# Patient Record
Sex: MALE | Race: WHITE | URBAN - METROPOLITAN AREA
[De-identification: names, ages, dates, MRNs, and addresses within clinical notes are randomized per-mention and may not be internally consistent; named-entity substitution may affect disease eponyms.]

---

## 2021-12-07 ENCOUNTER — OFFICE VISIT (OUTPATIENT)
Dept: SPORTS MEDICINE | Age: 61
End: 2021-12-07
Payer: MEDICAID

## 2021-12-07 VITALS — TEMPERATURE: 98.6 F | HEART RATE: 88 BPM | RESPIRATION RATE: 18 BRPM

## 2021-12-07 DIAGNOSIS — G89.29 CHRONIC RIGHT SACROILIAC JOINT PAIN: ICD-10-CM

## 2021-12-07 DIAGNOSIS — E11.42 DIABETIC POLYNEUROPATHY ASSOCIATED WITH TYPE 2 DIABETES MELLITUS (HCC): ICD-10-CM

## 2021-12-07 DIAGNOSIS — E66.01 MORBID OBESITY (HCC): ICD-10-CM

## 2021-12-07 DIAGNOSIS — M70.61 GREATER TROCHANTERIC BURSITIS OF RIGHT HIP: Primary | ICD-10-CM

## 2021-12-07 DIAGNOSIS — E11.40 TYPE 2 DIABETES MELLITUS WITH DIABETIC NEUROPATHY, WITHOUT LONG-TERM CURRENT USE OF INSULIN (HCC): ICD-10-CM

## 2021-12-07 DIAGNOSIS — M53.3 CHRONIC RIGHT SACROILIAC JOINT PAIN: ICD-10-CM

## 2021-12-07 PROBLEM — E11.9 DIABETES MELLITUS (HCC): Status: ACTIVE | Noted: 2019-01-01

## 2021-12-07 PROCEDURE — 99204 OFFICE O/P NEW MOD 45 MIN: CPT | Performed by: FAMILY MEDICINE

## 2021-12-07 RX ORDER — GABAPENTIN 800 MG/1
TABLET ORAL 3 TIMES DAILY
COMMUNITY

## 2021-12-07 RX ORDER — METHOCARBAMOL 500 MG/1
TABLET, FILM COATED ORAL 4 TIMES DAILY
COMMUNITY

## 2021-12-07 RX ORDER — METFORMIN HYDROCHLORIDE 1000 MG/1
1000 TABLET ORAL 2 TIMES DAILY WITH MEALS
COMMUNITY

## 2021-12-07 RX ORDER — AMITRIPTYLINE HYDROCHLORIDE 10 MG/1
20 TABLET, FILM COATED ORAL
COMMUNITY

## 2021-12-07 RX ORDER — OMEPRAZOLE 40 MG/1
40 CAPSULE, DELAYED RELEASE ORAL DAILY
COMMUNITY

## 2021-12-07 RX ORDER — GLIMEPIRIDE 4 MG/1
TABLET ORAL
COMMUNITY

## 2021-12-07 RX ORDER — LISINOPRIL 10 MG/1
TABLET ORAL DAILY
COMMUNITY

## 2021-12-07 RX ORDER — MELOXICAM 7.5 MG/1
TABLET ORAL DAILY
COMMUNITY

## 2021-12-07 RX ORDER — GUAIFENESIN 100 MG/5ML
81 LIQUID (ML) ORAL DAILY
COMMUNITY

## 2021-12-07 RX ORDER — ATORVASTATIN CALCIUM 10 MG/1
TABLET, FILM COATED ORAL DAILY
COMMUNITY

## 2021-12-07 NOTE — LETTER
12/7/2021    Patient: Rody Oro   YOB: 1960   Date of Visit: 12/7/2021     Perry Ochoa MD  [address] [place] [id number]  Via Fax: [phone]    Dear Perry Ochoa MD,      Thank you for referring Mr. Mahnaz Fields to [place] [address] for evaluation. My notes for this consultation are attached. If you have questions, please do not hesitate to call me. I look forward to following your patient along with you.       Sincerely,    Tamanna Anne MD

## 2021-12-07 NOTE — PROGRESS NOTES
[facility]  Sports Medicine Consultation Note    PCP: Ryne Cuellar MD  Requesting provider: Ryne Cuellar MD       Radames Taylor is a 59 y.o. male (: 1960) presenting to obtain consultative services regarding:  Chief Complaint   Patient presents with    Hip Pain     right    Back Pain     mid        Assessment/Plan:       ICD-10-CM ICD-9-CM   1. Greater trochanteric bursitis of right hip  M70.61 726.5   2. Chronic right sacroiliac joint pain  M53.3 724.6    G89.29 338.29   3. Type 2 diabetes mellitus with diabetic neuropathy, without long-term current use of insulin (HCC)  E11.40 250.60     357.2   4. Morbid obesity ([address]  E66.01 278.01   5. Diabetic polyneuropathy associated with type 2 diabetes mellitus (HCC)  E11.42 250.60     357.2       Discussion:  62yo morbidly obese RIGHT handed male with a PMHx of severe sensory loss neuropathy of bilateral lower extremities (below the knees) and hx of thoracolumbar back pain presents for evaluation for chronic RIGHT hip pain x 1+ years, noting that his PCP (new to him) would like him to wean down his daily use of meloxicam 15mg that he has been taking to manage this pain. Pertinent exam findings: + tender RIGHT great trochanter, + RIGHT SI joint tenderness, relatively weak hip adductors, tight bilateral hamstrings, chronic loss of light touch sensation distal to bilateral knees.       Impression:  RIGHT great troch bursitis  RIGHT SI joint pain / dysfunction  Bilateral lower leg sensory neuropathy      Plan:  > discussed mgmt options at length  > concur with Ryne Cuellar MD that it would be ideal to minimize the use of NSAIDs overall, given his other significant diabetic complications  > encouraged ice as needed  > start home exercise plan  > start formal physical therapy  > discussed consideration for steroid injection, however at this time, he is amenable deferring at this time  > is agreeable to work on weaning down the use of NSAID overall, hopefully to just 7.5mg as needed with goal of <2-3x/wk. Follow-up and Dispositions    · Return if symptoms worsen or fail to improve. Orders Placed This Encounter    REFERRAL TO PHYSICAL THERAPY     Referral Priority:   Routine     Referral Type:   PT/OT/ST     Referral Reason:   Specialty Services Required     Number of Visits Requested:   1         Management plan & patient instructions discussed with Winsome , who voiced understanding. Thank you for the opportunity to participate in the care of this patient. If any questions or concerns at all, please feel free to contact me. This document may have been created with the aid of dictation software. Text may contain errors, particularly phonetic errors. Gabriela Lynch MD  Internal Medicine, Family Medicine & Sports Medicine  12/7/2021    On this date 12/7/2021, I have spent 45 minutes providing care to this patient, which included reviewing the EMR to see if there were any recent visits to the ED, specialists, prior lab or radiology results, obtaining the history from the patient, examining the patient, providing discharge education regarding the diagnosis and counseling on appropriate follow-up, as well as documenting this visit in the EMR.     Subjective   History:     I was asked to provide consultative services by Amado Garnica MD for advice/opinion related to evaluating    Chief Complaint   Patient presents with    Hip Pain     right    Back Pain     mid                T2DM with most recent A1c reportedly 6.7      # RIGHT lateral hip pain x 1.5yr  No inciting trauma  Sx managed well with meloxicam 15mg daily  Worse with lying on RIGHT side, going up the steps leading with LEFT leg  No swelling, no redness  No changes in bowel / bladder habits  Occasionally it is challenging to get comfortable at night to sleep  Causes him to limit his walking \"max is once around [place]\"  Never had any issues like this before  Does not radiate    Has chronic bilateral lower extremity numbness below the knees (EMG proven)  New (to him) PCP would like him to cut back on the meloxicam  Decreased to 7.5mg daily and isn't controlling the pain as well    Also has chronic mid back (thoracolumbar) pain, for which he has attended formal PT in the past, and obtained good relief  Uses methocarbamol as needed, which is clinically effective      Prior Treatments for this complaint:    none    Previous Medications for this complaint:    APAP   Naproxen   IBU    Current Medications for this complaint:   meloxicam    Previous work-up for this complaint has included:    Radiograph(s)     Pain Assessment  12/7/2021   Location of Pain Back   Location Modifiers Medial   Severity of Pain 4   Quality of Pain Other (Comment)   Quality of Pain Comment tightness   Duration of Pain Persistent   Frequency of Pain Constant   Date Pain First Started (No Data)   Date Pain First Started Comment >3 years ago   Aggravating Factors Bending;Stretching;Straightening;Exercise;Kneeling;Squatting;Standing;Stairs; Walking   Limiting Behavior Yes   Relieving Factors NSAID;Rest   Relieving Factors Comment takes the edge off; massage chair   Result of Injury No   Type of Injury Other (Comment)           Past Medical History:   Diagnosis Date    Diabetes ([address]     Hypercholesterolemia     Hypertension      Past Surgical History:   Procedure Laterality Date    HX TONSILLECTOMY        reports that he quit smoking about 13 years ago. He does not have any smokeless tobacco history on file. He reports current alcohol use.   Family History   Problem Relation Age of Onset    Diabetes Sister      No Known Allergies    Problem List:      Patient Active Problem List    Diagnosis    Diabetic neuropathy (HCC)     Formatting of this note might be different from the original.  bilateral lower legs      Diabetes mellitus ([address]    Hyperlipidemia LDL goal < 100  Morbid obesity (HCC)       Medications:     Current Outpatient Medications on File Prior to Visit   Medication Sig Dispense Refill    gabapentin (NEURONTIN) 800 mg tablet Take  by mouth three (3) times daily.  amitriptyline (ELAVIL) 10 mg tablet Take 20 mg by mouth nightly.  atorvastatin (LIPITOR) 10 mg tablet Take  by mouth daily.  meloxicam (MOBIC) 7.5 mg tablet Take  by mouth daily.  methocarbamoL (ROBAXIN) 500 mg tablet Take  by mouth four (4) times daily.  metFORMIN (GLUCOPHAGE) 1,000 mg tablet Take 1,000 mg by mouth two (2) times daily (with meals).  glimepiride (AMARYL) 4 mg tablet Take  by mouth every morning.  dapagliflozin (FARXIGA) 5 mg tab tablet Take  by mouth daily.  aspirin 81 mg chewable tablet Take 81 mg by mouth daily.  omeprazole (PRILOSEC) 40 mg capsule Take 40 mg by mouth daily.  lisinopriL (PRINIVIL, ZESTRIL) 10 mg tablet Take  by mouth daily. No current facility-administered medications on file prior to visit. Objective   Physical Assessment:   VS:    Vitals:    12/07/21 1059   Pulse: 88   Resp: 18   Temp: 98.6 °F (37 °C)   PainSc:   4       Physical Exam  Nursing note reviewed. Constitutional:       General: He is not in acute distress. Appearance: Normal appearance. He is well-developed. He is morbidly obese. HENT:      Head: Normocephalic and atraumatic. Mouth/Throat:      Comments: Mask in place  Musculoskeletal:      Cervical back: Neck supple. Thoracic back: Tenderness (bilateral lower thoracic paraspinal m, lat dorsi m) present. Decreased range of motion. Lumbar back: Tenderness (R > L paraspinal m) present. No bony tenderness. Decreased range of motion. Negative right straight leg raise test and negative left straight leg raise test.      Right hip: Bony tenderness (++ great troch; + SIj) present. No tenderness. Normal range of motion. Decreased strength.       Left hip: No tenderness or bony tenderness. Normal range of motion. Decreased strength. Comments: Discomfort of bilateral proximal adductors with passive hip abduction; Neurological:      Mental Status: He is alert and oriented to person, place, and time. Sensory: Sensory deficit (no sensation to light touch distal to both knees) present. Gait: Gait abnormal (wide based). Deep Tendon Reflexes: Reflexes are normal and symmetric. Psychiatric:         Behavior: Behavior normal. Behavior is cooperative. Thought Content: Thought content normal.         Recent Labs & Imaging:     RIGHT hip XR (11/5/2020): Mild right greater trochanter enthesopathy      MRI Thoracic-Spine (10/07/2019):      1. Multilevel mild thoracic disc disease with no evidence of neural impingement. 2. Multilevel lower cervical spine disc disease, incompletely evaluated on this examination but not highly likely to be producing any significant neural impingement. 3. Intrinsically normal cord. 4. No significant osseous findings.          Review of Previous Medical Records:     Records from PCP / referral:      PCP: Jb JACINTO hip enthesopathy    started meloxicam 7.5mg on 11/5/2021  hx of PUD

## 2021-12-15 ENCOUNTER — APPOINTMENT (OUTPATIENT)
Dept: PHYSICAL THERAPY | Age: 61
End: 2021-12-15

## 2022-01-05 ENCOUNTER — APPOINTMENT (OUTPATIENT)
Dept: PHYSICAL THERAPY | Age: 62
End: 2022-01-05

## 2025-07-23 NOTE — PATIENT INSTRUCTIONS
- ice to the area when painful  - I think it is a good idea to work your way off the daily use of meloxicam... it will get easier to do so with physical therapy!  - you can start with some of the exercises (GENTLY) below, while you are waiting on your initial physical therapy evaluation      Be sure to give the physical therapists some feedback! Let them know: what your short term & long term goals are. .. If exercises seems too easy or too hard. .. If the previous PT session left you more sore than usual, etc.  It is important for them to know these things in order to tailor your rehab plan. The [facility] office should be calling you to schedule in the next 3-5 business days. If you don't hear from them after 1 week, call their office directly to check on the status of your referral.              VISIT SURVEY       You may receive a survey regarding your visit today either by mail or email. Please take the opportunity to let us know how we did. This information helps us continue to improve and provide a great patient experience. Hip Bursitis: Care Instructions  Your Care Instructions     Bursitis is inflammation of the bursa. A bursa is a small sac of fluid that cushions a joint and helps it move easily. A bursa sits between a bone in the hip and the muscles and tendons in the thigh and buttock. Injury or overuse of the hip can cause bursitis. Activities that can lead to bursitis include twisting and rapid joint movement. Bursitis can cause hip pain. Bursitis usually gets better if you avoid the activity that caused it. If pain lasts or gets worse despite home treatment, your doctor may draw fluid from the bursa through a needle. This may relieve your pain and help your doctor know if you have an infection. If so, your doctor will prescribe antibiotics. If you have inflammation only, you may get a corticosteroid shot to reduce swelling and pain.  Sometimes surgery is needed to drain or remove the bursa. Follow-up care is a key part of your treatment and safety. Be sure to make and go to all appointments, and call your doctor if you are having problems. It's also a good idea to know your test results and keep a list of the medicines you take. How can you care for yourself at home? · Put ice or a cold pack on your hip for 10 to 20 minutes at a time. Put a thin cloth between the ice and your skin. · After 3 days of using ice, you may use heat on your hip. You can use a hot water bottle, a heating pad set on low, or a warm, moist towel. · Rest your hip. Stop any activities that cause pain. Switch to activities that do not stress your hip. · Take your medicines exactly as prescribed. Call your doctor if you think you are having a problem with your medicine. · Ask your doctor if you can take an over-the-counter pain medicine, such as acetaminophen (Tylenol), ibuprofen (Advil, Motrin), or naproxen (Aleve). Be safe with medicines. Read and follow all instructions on the label. · To prevent stiffness, gently move the hip joint as much as you can without pain every day. As the pain gets better, keep doing range-of-motion exercises. Ask your doctor for exercises that will make the muscles around the hip joint stronger. Do these as directed. · You can slowly return to the activity that caused the pain, but do it with less effort until you can do it without pain or swelling. Be sure to warm up before and stretch after you do the activity. When should you call for help? Call your doctor now or seek immediate medical care if:    · You have a fever.     · You have increased swelling or redness in your hip.     · You cannot use your hip, or the pain in your hip gets worse. Watch closely for changes in your health, and be sure to contact your doctor if:    · You have pain for 2 weeks or longer despite home treatment. Where can you learn more?   Go to http://www.gray.com/  Enter [id number] in the search box to learn more about \"Hip Bursitis: Care Instructions. \"  Current as of: July 1, 2021               Content Version: 13.0  © 2006-2021 [employer]. Care instructions adapted under license by [employer] (which disclaims liability or warranty for this information). If you have questions about a medical condition or this instruction, always ask your healthcare professional. [address] any warranty or liability for your use of this information. Hip Bursitis: Exercises  Introduction  Here are some examples of exercises for you to try. The exercises may be suggested for a condition or for rehabilitation. Start each exercise slowly. Ease off the exercises if you start to have pain. You will be told when to start these exercises and which ones will work best for you. How to do the exercises  Hip rotator stretch    1. Lie on your back with both knees bent and your feet flat on the floor. 2. Put the ankle of your affected leg on your opposite thigh near your knee. 3. Use your hand to gently push your knee away from your body until you feel a gentle stretch around your hip. 4. Hold the stretch for 15 to 30 seconds. 5. Repeat 2 to 4 times. 6. Repeat steps 1 through 5, but this time use your hand to gently pull your knee toward your opposite shoulder. Iliotibial band stretch    1. Lean sideways against a wall. If you are not steady on your feet, hold on to a chair or counter. 2. Stand on the leg with the affected hip, with that leg close to the wall. Then cross your other leg in front of it. 3. Let your affected hip drop out to the side of your body and against wall. Then lean away from your affected hip until you feel a stretch. 4. Hold the stretch for 15 to 30 seconds. 5. Repeat 2 to 4 times. Straight-leg raises to the outside    1.  Lie on your side, with your affected hip on top.  2. Tighten the front thigh muscles of your top leg to keep your knee straight. 3. Keep your hip and your leg straight in line with the rest of your body, and keep your knee pointing forward. Do not drop your hip back. 4. Lift your top leg straight up toward the ceiling, about 12 inches off the floor. Hold for about 6 seconds, then slowly lower your leg. 5. Repeat 8 to 12 times. Clamshell    1. Lie on your side, with your affected hip on top and your head propped on a pillow. Keep your feet and knees together and your knees bent. 2. Raise your top knee, but keep your feet together. Do not let your hips roll back. Your legs should open up like a clamshell. 3. Hold for 6 seconds. 4. Slowly lower your knee back down. Rest for 10 seconds. 5. Repeat 8 to 12 times. Follow-up care is a key part of your treatment and safety. Be sure to make and go to all appointments, and call your doctor if you are having problems. It's also a good idea to know your test results and keep a list of the medicines you take. Where can you learn more? Go to http://www.almonte.com/  Enter H674 in the search box to learn more about \"Hip Bursitis: Exercises. \"  Current as of: July 1, 2021               Content Version: 13.0  © 2006-2021 [employer]. Care instructions adapted under license by [employer] (which disclaims liability or warranty for this information). If you have questions about a medical condition or this instruction, always ask your healthcare professional. [place] [address] any warranty or liability for your use of this information. Sacroiliac Joint Pain: Care Instructions  Your Care Instructions     The sacroiliac joints connect the spine and each side of the pelvis. These joints bear the weight and stress of your torso. This makes them easy to injure. Injury or overuse of these joints may cause low back pain.   Stress on these joints can cause joint pain. Sacroiliac joint pain is more common in pregnant women. Certain kinds of arthritis also may cause this type of joint pain. Home treatment may help you feel better. So can avoiding activities that stress your back. Your doctor also may recommend physical therapy. This may include doing exercises and stretches to help with pain. You may also learn to use good posture. Follow-up care is a key part of your treatment and safety. Be sure to make and go to all appointments, and call your doctor if you are having problems. It's also a good idea to know your test results and keep a list of the medicines you take. How can you care for yourself at home? · Ask your doctor about light exercises that may help your back pain. Try to do light activity throughout the day. But make sure to take rests as needed. Find a comfortable position for rest, but don't stay in one position for too long. Avoid activities that cause pain. · To apply heat, put a warm water bottle, a heating pad set on low, or a warm cloth on your back. Do not go to sleep with a heating pad on your skin. · Put ice or a cold pack on your back for 10 to 20 minutes at a time. Put a thin cloth between the ice and your skin. · If the doctor gave you a prescription medicine for pain, take it as prescribed. · If you are not taking a prescription pain medicine, ask your doctor if you can take an over-the-counter pain medicine, such as acetaminophen (Tylenol), ibuprofen (Advil, Motrin), or naproxen (Aleve). Read and follow all instructions on the label. Take pain medicines exactly as directed. · Do not take two or more pain medicines at the same time unless the doctor told you to. Many pain medicines have acetaminophen, which is Tylenol. Too much acetaminophen (Tylenol) can be harmful. · To prevent future back pain, do exercises to stretch and strengthen your back and stomach.  Learn how to use good posture, safe lifting techniques, and proper body mechanics. When should you call for help? Call 911 anytime you think you may need emergency care. For example, call if:    · You are unable to move a leg at all. Call your doctor now or seek immediate medical care if:    · You have new or worse symptoms in your legs or buttocks. Symptoms may include:  ? Numbness or tingling. ? Weakness. ? Pain.     · You lose bladder or bowel control. Watch closely for changes in your health, and be sure to contact your doctor if:    · You are not getting better as expected. Where can you learn more? Go to http://www.gray.com/  Enter Y571 in the search box to learn more about \"Sacroiliac Joint Pain: Care Instructions. \"  Current as of: July 1, 2021               Content Version: 13.0  © 2006-2021 [employer]. Care instructions adapted under license by [employer] (which disclaims liability or warranty for this information). If you have questions about a medical condition or this instruction, always ask your healthcare professional. [place] [address] any warranty or liability for your use of this information. Sacroiliac Pain: Exercises  Introduction  Here are some examples of exercises for you to try. The exercises may be suggested for a condition or for rehabilitation. Start each exercise slowly. Ease off the exercises if you start to have pain. You will be told when to start these exercises and which ones will work best for you. How to do the exercises  Knee-to-chest stretch    1. Do not do the knee-to-chest exercise if it causes or increases back or leg pain. 2. Lie on your back with your knees bent and your feet flat on the floor. You can put a small pillow under your head and neck if it is more comfortable. 3. Grasp your hands under one knee and bring the knee to your chest, keeping the other foot flat on the floor. 4. Keep your lower back pressed to the floor.  Hold for at least 15 to 30 seconds. 5. Relax and lower the knee to the starting position. Repeat with the other leg. 6. Repeat 2 to 4 times with each leg. 7. To get more stretch, keep your other leg flat on the floor while pulling your knee to your chest.  Bridging    1. Lie on your back with both knees bent. Your knees should be bent about 90 degrees. 2. Tighten your belly muscles by pulling in your belly button toward your spine. Then push your feet into the floor, squeeze your buttocks, and lift your hips off the floor until your shoulders, hips, and knees are all in a straight line. 3. Hold for about 6 seconds as you continue to breathe normally, and then slowly lower your hips back down to the floor and rest for up to 10 seconds. 4. Repeat 8 to 12 times. Hip extension    1. Get down on your hands and knees on the floor. 2. Keeping your back and neck straight, lift one leg straight out behind you. When you lift your leg, keep your hips level. Don't let your back twist, and don't let your hip drop toward the floor. 3. Hold for 6 seconds. Repeat 8 to 12 times with each leg. 4. If you feel steady and strong when you do this exercise, you can make it more difficult. To do this, when you lift your leg, also lift the opposite arm straight out in front of you. For example, lift the left leg and the right arm at the same time. (This is sometimes called the \"bird dog exercise. \") Hold for 6 seconds, and repeat 8 to 12 times on each side. Clamshell    1. Lie on your side with a pillow under your head. Keep your feet and knees together and your knees bent. 2. Raise your top knee, but keep your feet together. Do not let your hips roll back. Your legs should open up like a clamshell. 3. Hold for 6 seconds. 4. Slowly lower your knee back down. Rest for 10 seconds. 5. Repeat 8 to 12 times. 6. Switch to your other side and repeat steps 1 through 5. Hamstring wall stretch    1.  Lie on your back in a doorway, with one leg through the open door.  2. Slide your affected leg up the wall to straighten your knee. You should feel a gentle stretch down the back of your leg. 3. Hold the stretch for at least 1 minute to begin. Then try to lengthen the time you hold the stretch to as long as 6 minutes. 4. Switch legs, and repeat steps 1 through 3.  5. Repeat 2 to 4 times. 6. If you do not have a place to do this exercise in a doorway, there is another way to do it:  7. Lie on your back, and bend one knee. 8. Loop a towel under the ball and toes of that foot, and hold the ends of the towel in your hands. 9. Straighten your knee, and slowly pull back on the towel. You should feel a gentle stretch down the back of your leg. 10. Switch legs, and repeat steps 1 through 3.  11. Repeat 2 to 4 times. 1. Do not arch your back. 2. Do not bend either knee. 3. Keep one heel touching the floor and the other heel touching the wall. Do not point your toes. Lower abdominal strengthening    1. Lie on your back with your knees bent and your feet flat on the floor. 2. Tighten your belly muscles by pulling your belly button in toward your spine. 3. Lift one foot off the floor and bring your knee toward your chest, so that your knee is straight above your hip and your leg is bent like the letter \"L. \"  4. Lift the other knee up to the same position. 5. Lower one leg at a time to the starting position. 6. Keep alternating legs until you have lifted each leg 8 to 12 times. 7. Be sure to keep your belly muscles tight and your back still as you are moving your legs. Be sure to breathe normally. Piriformis stretch    1. Lie on your back with your legs straight. 2. Lift your affected leg, and bend your knee. With your opposite hand, reach across your body, and then gently pull your knee toward your opposite shoulder. 3. Hold the stretch for 15 to 30 seconds. 4. Switch legs and repeat steps 1 through 3.  5. Repeat 2 to 4 times.   Follow-up care is a key part of your Initiate Treatment: doxycycline hyclate 100 mg capsules\\nTake 1 capsule po BID for 1 month treatment and safety. Be sure to make and go to all appointments, and call your doctor if you are having problems. It's also a good idea to know your test results and keep a list of the medicines you take. Where can you learn more? Go to http://www.gray.com/  Enter N366 in the search box to learn more about \"Sacroiliac Pain: Exercises. \"  Current as of: July 1, 2021               Content Version: 13.0  © 2006-2021 Healthwise, Incorporated. Care instructions adapted under license by [employer] (which disclaims liability or warranty for this information). If you have questions about a medical condition or this instruction, always ask your healthcare professional. [address] any warranty or liability for your use of this information. Detail Level: Zone Continue Regimen: Clindamycin lotion qd prn Otc Regimen: Benzoyl peroxide cleanser qd prn